# Patient Record
Sex: MALE | Race: WHITE
[De-identification: names, ages, dates, MRNs, and addresses within clinical notes are randomized per-mention and may not be internally consistent; named-entity substitution may affect disease eponyms.]

---

## 2018-04-08 ENCOUNTER — HOSPITAL ENCOUNTER (EMERGENCY)
Dept: HOSPITAL 45 - C.EDB | Age: 23
Discharge: HOME | End: 2018-04-08
Payer: COMMERCIAL

## 2018-04-08 VITALS
HEIGHT: 70 IN | WEIGHT: 182.98 LBS | BODY MASS INDEX: 26.2 KG/M2 | HEIGHT: 70 IN | WEIGHT: 182.98 LBS | BODY MASS INDEX: 26.2 KG/M2

## 2018-04-08 VITALS — TEMPERATURE: 97.88 F

## 2018-04-08 VITALS — DIASTOLIC BLOOD PRESSURE: 67 MMHG | HEART RATE: 80 BPM | OXYGEN SATURATION: 98 % | SYSTOLIC BLOOD PRESSURE: 127 MMHG
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
## 2018-04-08 DIAGNOSIS — S01.81XA: Primary | ICD-10-CM

## 2018-04-08 DIAGNOSIS — Z79.899: ICD-10-CM

## 2018-04-08 LAB
BUN SERPL-MCNC: 10 MG/DL (ref 7–18)
CALCIUM SERPL-MCNC: 8.1 MG/DL (ref 8.5–10.1)
CO2 SERPL-SCNC: 22 MMOL/L (ref 21–32)
CREAT SERPL-MCNC: 0.79 MG/DL (ref 0.6–1.4)
GLUCOSE SERPL-MCNC: 107 MG/DL (ref 70–99)
POTASSIUM SERPL-SCNC: 3.1 MMOL/L (ref 3.5–5.1)
SODIUM SERPL-SCNC: 138 MMOL/L (ref 136–145)
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
## 2018-04-08 NOTE — DIAGNOSTIC IMAGING REPORT
CHEST ONE VIEW PORTABLE



CLINICAL HISTORY: Chest pain status post trauma    



COMPARISON STUDY:  No previous studies for comparison.



FINDINGS: The cardiac and mediastinal contours are normal. There is no evidence

of focal pulmonary consolidation. There is no evidence of failure. No pleural

effusions are visualized.[ No pneumothorax is visualized.



IMPRESSION: No active disease in the chest.







Electronically signed by:  Amando Bravo M.D.

4/8/2018 6:34 AM



Dictated Date/Time:  4/8/2018 6:33 AM

## 2018-04-08 NOTE — DIAGNOSTIC IMAGING REPORT
CT FACIAL BONES-MXILLOFAC WITHOUT



CT DOSE:    



CLINICAL HISTORY: Facial pain status post trauma    



COMPARISON STUDY:  No previous studies for comparison.



TECHNIQUE: Helical images were acquired in the transverse plane. The study was

reviewed and analyzed on the independent 3-D workstation.  A dose lowering

technique was utilized adhering to the principles of ALARA.





The pterygoid plates appear intact.



The zygomatic arches appear intact.



The globes appear intact. There is no evidence of orbital emphysema.



The orbital walls and floor appear intact.



The mandibular condyles appear intact.



There is ethmoid maxillary and frontal sinus mucosal thickening.



IMPRESSION: No facial fractures identified.







Electronically signed by:  Amando Bravo M.D.

4/8/2018 8:36 AM



Dictated Date/Time:  4/8/2018 8:34 AM
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
